# Patient Record
Sex: MALE | Race: WHITE | NOT HISPANIC OR LATINO | Employment: OTHER | URBAN - METROPOLITAN AREA
[De-identification: names, ages, dates, MRNs, and addresses within clinical notes are randomized per-mention and may not be internally consistent; named-entity substitution may affect disease eponyms.]

---

## 2024-11-22 ENCOUNTER — HOSPITAL ENCOUNTER (EMERGENCY)
Facility: HOSPITAL | Age: 74
Discharge: HOME | End: 2024-11-22
Attending: EMERGENCY MEDICINE
Payer: COMMERCIAL

## 2024-11-22 ENCOUNTER — APPOINTMENT (EMERGENCY)
Dept: CT IMAGING | Facility: HOSPITAL | Age: 74
End: 2024-11-22
Payer: COMMERCIAL

## 2024-11-22 VITALS
WEIGHT: 188.71 LBS | RESPIRATION RATE: 18 BRPM | OXYGEN SATURATION: 99 % | TEMPERATURE: 97.4 F | SYSTOLIC BLOOD PRESSURE: 152 MMHG | DIASTOLIC BLOOD PRESSURE: 89 MMHG | HEART RATE: 92 BPM

## 2024-11-22 DIAGNOSIS — S09.90XA INJURY OF HEAD, INITIAL ENCOUNTER: Primary | ICD-10-CM

## 2024-11-22 DIAGNOSIS — R51.9 ACUTE HEADACHE: ICD-10-CM

## 2024-11-22 PROCEDURE — 72125 CT NECK SPINE W/O DYE: CPT

## 2024-11-22 PROCEDURE — 70450 CT HEAD/BRAIN W/O DYE: CPT

## 2024-11-22 PROCEDURE — 99284 EMERGENCY DEPT VISIT MOD MDM: CPT

## 2024-11-22 PROCEDURE — 99284 EMERGENCY DEPT VISIT MOD MDM: CPT | Performed by: EMERGENCY MEDICINE

## 2024-11-22 RX ORDER — ACETAMINOPHEN 325 MG/1
975 TABLET ORAL ONCE
Status: COMPLETED | OUTPATIENT
Start: 2024-11-22 | End: 2024-11-22

## 2024-11-22 RX ADMIN — ACETAMINOPHEN 975 MG: 325 TABLET, FILM COATED ORAL at 22:23

## 2024-11-23 NOTE — ED PROVIDER NOTES
Time reflects when diagnosis was documented in both MDM as applicable and the Disposition within this note       Time User Action Codes Description Comment    11/22/2024  9:56 PM Alcon Maynard Add [S09.90XA] Injury of head, initial encounter     11/22/2024  9:56 PM Alcon Maynard Add [R51.9] Acute headache           ED Disposition       ED Disposition   Discharge    Condition   Stable    Date/Time   Fri Nov 22, 2024 10:19 PM    Comment   Jagdish Beatty discharge to home/self care.                   Assessment & Plan       Medical Decision Making  74-year-old male history of CAD on Plavix, diabetes, seizures presenting with head injury.  Fall head strike on Plavix.  No LOC.  Atraumatic exam.  No neck or back tenderness.  Plan for CT imaging.  Symptom management with oral Tylenol.  No reported chest pain or injury.  Will defer chest x-ray.  Reassess.    Imaging no significant acute process.  Head injury precautions. Discussed results and recommendations. Advised follow up PCP. Medication recommendations. Given instructions and return precautions. Patient/family at bedside acknowledged understanding of all written and verbal instructions and return precautions. Discharged.     Amount and/or Complexity of Data Reviewed  Radiology: ordered.    Risk  OTC drugs.             Medications   acetaminophen (TYLENOL) tablet 975 mg (has no administration in time range)       ED Risk Strat Scores                                               History of Present Illness       Chief Complaint   Patient presents with    Fall     Patient arrives to the ER from home with complaints of fall + hit head +on plavix. Notified ED provider and charge RN upon arrival to ED. -loc + head injury.        Past Medical History:   Diagnosis Date    Diabetes (HCC)     High cholesterol     Hypothyroid     Seizures (HCC)       Past Surgical History:   Procedure Laterality Date    CORONARY ANGIOPLASTY WITH STENT PLACEMENT        No family history on file.        E-Cigarette/Vaping      E-Cigarette/Vaping Substances      I have reviewed and agree with the history as documented.     74-year-old male history of CAD on Plavix, diabetes, seizures presenting with head injury.  Patient reports that he was attempting to take his shoes off when he lost his balance and fell backwards hitting his head.  Denies LOC.  Reports pain to the back of his head.  Denies any neck or back pain.  Denies chest pain shortness of breath.  Denies abdominal pain nausea vomiting diarrhea.  Denies any other complaints.  Chart reviewed.    Airway intact  Breathing clear to auscultation bilaterally  Circulation 2+ DP PT radial pulses bilaterally  Disability GCS 15  Exposure completed    Past Medical History:  No date: Diabetes (HCC)  No date: High cholesterol  No date: Hypothyroid  No date: Seizures (HCC)  Family History: non-contributory  Social History          Review of Systems   Constitutional:  Negative for appetite change, chills, diaphoresis, fever and unexpected weight change.   HENT:  Negative for congestion and rhinorrhea.    Eyes:  Negative for photophobia and visual disturbance.   Respiratory:  Negative for cough, chest tightness and shortness of breath.    Cardiovascular:  Negative for chest pain, palpitations and leg swelling.   Gastrointestinal:  Negative for abdominal distention, abdominal pain, blood in stool, constipation, diarrhea, nausea and vomiting.   Genitourinary:  Negative for dysuria and hematuria.   Musculoskeletal:  Negative for back pain, joint swelling, neck pain and neck stiffness.   Skin:  Negative for color change, pallor, rash and wound.   Neurological:  Positive for headaches. Negative for dizziness, syncope, weakness and light-headedness.   Psychiatric/Behavioral:  Negative for agitation.    All other systems reviewed and are negative.          Objective       ED Triage Vitals [11/22/24 2145]   Temperature Pulse Blood Pressure Respirations SpO2 Patient Position -  Orthostatic VS   (!) 97.4 °F (36.3 °C) 98 155/89 18 98 % --      Temp Source Heart Rate Source BP Location FiO2 (%) Pain Score    Oral Monitor -- -- --      Vitals      Date and Time Temp Pulse SpO2 Resp BP Pain Score FACES Pain Rating User   11/22/24 2150 -- 92 99 % 18 152/89 -- -- [place]   11/22/24 2145 97.4 °F (36.3 °C) 98 98 % 18 155/89 -- -- [place]            Physical Exam  Vitals and nursing note reviewed.   Constitutional:       General: He is not in acute distress.     Appearance: Normal appearance. He is well-developed. He is not ill-appearing, toxic-appearing or diaphoretic.   HENT:      Head: Normocephalic and atraumatic.      Nose: Nose normal. No congestion or rhinorrhea.      Mouth/Throat:      Mouth: Mucous membranes are moist.      Pharynx: Oropharynx is clear. No oropharyngeal exudate or posterior oropharyngeal erythema.   Eyes:      General: No scleral icterus.        Right eye: No discharge.         Left eye: No discharge.      Extraocular Movements: Extraocular movements intact.      Conjunctiva/sclera: Conjunctivae normal.      Pupils: Pupils are equal, round, and reactive to light.   Neck:      Vascular: No JVD.      Trachea: No tracheal deviation.      Comments: Supple. Normal range of motion.   Cardiovascular:      Rate and Rhythm: Normal rate and regular rhythm.      Heart sounds: Normal heart sounds. No murmur heard.     No friction rub. No gallop.      Comments: Normal rate and regular rhythm  Pulmonary:      Effort: Pulmonary effort is normal. No respiratory distress.      Breath sounds: Normal breath sounds. No stridor. No wheezing or rales.      Comments: Clear to auscultation bilaterally  Chest:      Chest wall: No tenderness.   Abdominal:      General: Bowel sounds are normal. There is no distension.      Palpations: Abdomen is soft.      Tenderness: There is no abdominal tenderness. There is no right CVA tenderness, left CVA tenderness, guarding or rebound.      Comments: Soft, nontender,  nondistended.  Normal bowel sounds throughout   Musculoskeletal:         General: No swelling, tenderness, deformity or signs of injury. Normal range of motion.      Cervical back: Normal range of motion and neck supple. No rigidity or tenderness. No muscular tenderness.      Right lower leg: No edema.      Left lower leg: No edema.      Comments: No neck or back tenderness including midline   Lymphadenopathy:      Cervical: No cervical adenopathy.   Skin:     General: Skin is warm and dry.      Coloration: Skin is not pale.      Findings: No erythema or rash.   Neurological:      General: No focal deficit present.      Mental Status: He is alert. Mental status is at baseline.      Sensory: No sensory deficit.      Motor: No weakness or abnormal muscle tone.      Coordination: Coordination normal.      Gait: Gait normal.      Comments: Alert.  Strength and sensation grossly intact.  Ambulatory without difficulty at baseline.    Psychiatric:         Behavior: Behavior normal.         Thought Content: Thought content normal.         Results Reviewed       None            TRAUMA - CT head wo contrast   Final Interpretation by Nhan Boyd MD (11/22 2212)      No acute intracranial abnormality.      Mild chronic small vessel ischemic changes and volume loss.            Workstation performed: [id number]         TRAUMA - CT spine cervical wo contrast   Final Interpretation by Nhan Boyd MD (11/22 2215)      No cervical spine fracture or traumatic malalignment.                  Workstation performed: [id number]             Procedures    ED Medication and Procedure Management   None     Patient's Medications    No medications on file     No discharge procedures on file.  ED SEPSIS DOCUMENTATION   Time reflects when diagnosis was documented in both MDM as applicable and the Disposition within this note       Time User Action Codes Description Comment    11/22/2024  9:56 PM Alcon Maynard Add [S09.90XA] Injury of  head, initial encounter     11/22/2024  9:56 PM Alcon Maynard Add [R51.9] Acute headache                  Alcon Maynard MD  11/22/24 2639